# Patient Record
Sex: MALE | Race: WHITE
[De-identification: names, ages, dates, MRNs, and addresses within clinical notes are randomized per-mention and may not be internally consistent; named-entity substitution may affect disease eponyms.]

---

## 2017-06-14 ENCOUNTER — HOSPITAL ENCOUNTER (EMERGENCY)
Dept: HOSPITAL 26 - MED | Age: 57
Discharge: HOME | End: 2017-06-14
Payer: COMMERCIAL

## 2017-06-14 VITALS — DIASTOLIC BLOOD PRESSURE: 72 MMHG | SYSTOLIC BLOOD PRESSURE: 119 MMHG

## 2017-06-14 VITALS — WEIGHT: 220 LBS | BODY MASS INDEX: 29.8 KG/M2 | HEIGHT: 72 IN

## 2017-06-14 VITALS — SYSTOLIC BLOOD PRESSURE: 130 MMHG | DIASTOLIC BLOOD PRESSURE: 67 MMHG

## 2017-06-14 DIAGNOSIS — J45.909: ICD-10-CM

## 2017-06-14 DIAGNOSIS — K74.60: ICD-10-CM

## 2017-06-14 DIAGNOSIS — R19.7: ICD-10-CM

## 2017-06-14 DIAGNOSIS — R18.8: Primary | ICD-10-CM

## 2017-06-14 LAB
ALBUMIN FLD-MCNC: 3.2 G/DL (ref 3.4–5)
ALP SERPL-CCNC: 162 U/L (ref 46–116)
ALT SERPL-CCNC: 24 U/L (ref 12–78)
ANION GAP SERPL CALCULATED.3IONS-SCNC: 10.9 MMOL/L (ref 8–16)
APTT PPP: 28.5 SECS (ref 22–35.6)
AST SERPL-CCNC: 23 U/L (ref 15–37)
BASOPHILS # BLD AUTO: 0.1 K/UL (ref 0–0.22)
BASOPHILS NFR BLD AUTO: 1.3 % (ref 0–2)
BILIRUB SERPL-MCNC: 0.2 MG/DL (ref 0–1)
BUN SERPL-MCNC: 15 MG/DL (ref 7–18)
CALCIUM SPEC-MCNC: 9.2 MG/DL (ref 8.5–10.1)
CHLORIDE SERPL-SCNC: 107 MMOL/L (ref 98–107)
CO2 SERPL-SCNC: 32.2 MMOL/L (ref 21–32)
CREAT SERPL-MCNC: 1.1 MG/DL (ref 0.6–1.3)
EOSINOPHIL # BLD AUTO: 0.1 K/UL (ref 0–0.4)
EOSINOPHIL NFR BLD AUTO: 1.7 % (ref 0–4)
ERYTHROCYTE [DISTWIDTH] IN BLOOD BY AUTOMATED COUNT: 15.9 % (ref 11.6–13.7)
GFR SERPL CREATININE-BSD FRML MDRD: 88 ML/MIN (ref 90–?)
GLUCOSE SERPL-MCNC: 94 MG/DL (ref 74–106)
HCT VFR BLD AUTO: 42.3 % (ref 36–52)
HGB BLD-MCNC: 13.6 G/DL (ref 12–18)
INR PPP: 1.1 (ref 0.8–1.2)
LYMPHOCYTES # BLD AUTO: 0.9 K/UL (ref 2–11.5)
LYMPHOCYTES NFR BLD AUTO: 16.3 % (ref 20.5–51.1)
MCH RBC QN AUTO: 30 PG (ref 27–31)
MCHC RBC AUTO-ENTMCNC: 32 G/DL (ref 33–37)
MCV RBC AUTO: 93 FL (ref 80–94)
MONOCYTES # BLD AUTO: 0.5 K/UL (ref 0.8–1)
MONOCYTES NFR BLD AUTO: 8.4 % (ref 1.7–9.3)
NEUTROPHILS # BLD AUTO: 4.2 K/UL (ref 1.8–7.7)
NEUTROPHILS NFR BLD AUTO: 72.3 % (ref 42.2–75.2)
PLATELET # BLD AUTO: 143 K/UL (ref 140–450)
POTASSIUM SERPL-SCNC: 4.1 MMOL/L (ref 3.5–5.1)
PROT SERPL-MCNC: 8.9 G/DL (ref 6.4–8.2)
PROTHROMBIN TIME: 11.2 SECS (ref 10.8–13.4)
RBC # BLD AUTO: 4.57 MIL/UL (ref 4.2–6.1)
SODIUM SERPL-SCNC: 146 MMOL/L (ref 136–145)
WBC # BLD AUTO: 5.8 K/UL (ref 4.8–10.8)

## 2017-06-14 NOTE — NUR
PATIENT IS A 56 YO MALE BIB CARETAKER VIA WHEELCHAIR FOR BLOODY DIARRHEA AND 
ABDOMINAL PAIN. PATIENT IS AWAKE AND ALERT, ABLE TO MAKE NEEDS KNOWN SEEN IN 
OVERFLOW BY DR. LEES.

## 2017-06-14 NOTE — NUR
Patient discharged with v/s stable. Written and verbal after care instructions 
given and explained. 

Patient alert, oriented and verbalized understanding of instructions. Wheel 
Chair Assisted with by caregiver. All questions addressed prior to discharge. 
ID band removed. Patient advised to follow up with PMD. Rx of ULTRAM AND CIPRO 
given. Patient educated on indication of medication including possible reaction 
and side effects. Opportunity to ask questions provided and answered.

## 2017-07-25 ENCOUNTER — HOSPITAL ENCOUNTER (EMERGENCY)
Dept: HOSPITAL 26 - MED | Age: 57
Discharge: HOME | End: 2017-07-25
Payer: COMMERCIAL

## 2017-07-25 VITALS — HEIGHT: 75 IN | WEIGHT: 196 LBS | BODY MASS INDEX: 24.37 KG/M2

## 2017-07-25 VITALS — DIASTOLIC BLOOD PRESSURE: 81 MMHG | SYSTOLIC BLOOD PRESSURE: 118 MMHG

## 2017-07-25 DIAGNOSIS — S42.031A: Primary | ICD-10-CM
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
## 2017-07-25 DIAGNOSIS — G40.909: ICD-10-CM

## 2017-07-25 DIAGNOSIS — Z85.9: ICD-10-CM

## 2017-10-04 ENCOUNTER — HOSPITAL ENCOUNTER (OUTPATIENT)
Dept: HOSPITAL 26 - MRD | Age: 57
Discharge: HOME | End: 2017-10-04
Payer: COMMERCIAL

## 2017-10-04 DIAGNOSIS — S42.001D: Primary | ICD-10-CM
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
## 2021-11-26 ENCOUNTER — HOSPITAL ENCOUNTER (EMERGENCY)
Dept: HOSPITAL 26 - MED | Age: 61
Discharge: HOME | End: 2021-11-26
Payer: COMMERCIAL

## 2021-11-26 VITALS — HEIGHT: 75 IN | WEIGHT: 200 LBS | BODY MASS INDEX: 24.87 KG/M2

## 2021-11-26 VITALS — DIASTOLIC BLOOD PRESSURE: 98 MMHG | SYSTOLIC BLOOD PRESSURE: 135 MMHG

## 2021-11-26 VITALS — SYSTOLIC BLOOD PRESSURE: 135 MMHG | DIASTOLIC BLOOD PRESSURE: 98 MMHG

## 2021-11-26 DIAGNOSIS — S09.90XA: Primary | ICD-10-CM

## 2021-11-26 DIAGNOSIS — Z88.6: ICD-10-CM
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
## 2021-11-26 DIAGNOSIS — Z79.899: ICD-10-CM

## 2021-11-26 DIAGNOSIS — J45.909: ICD-10-CM

## 2021-11-26 DIAGNOSIS — Z85.038: ICD-10-CM

## 2021-11-26 DIAGNOSIS — Z88.0: ICD-10-CM
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
## 2021-11-26 NOTE — NUR
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 PT C/O HEAD 
PAIN 6/10 DESCRIBES AS ACHING NON-RADIATING. PT DENIES LOC. DENIES 
FEVER/CHILLS. DENIES N/V/D.



PMH: EPILEPSY



ALLERGIES: PENICILLIN, ASPIRIN, CEPHALOSPORIN

## 2021-11-26 NOTE — NUR
Patient discharged with v/s stable. Written and verbal after care instructions 
given FOR HEAD INJURY and explained. 

Patient verbalized understanding. Wheel Chair Assisted with by caregiver. All 
questions addressed prior to discharge. Advised to follow up with PMD.

## 2022-09-24 ENCOUNTER — HOSPITAL ENCOUNTER (EMERGENCY)
Dept: HOSPITAL 26 - MED | Age: 62
Discharge: HOME | End: 2022-09-24
Payer: COMMERCIAL

## 2022-09-24 VITALS — BODY MASS INDEX: 26.24 KG/M2 | WEIGHT: 198 LBS | HEIGHT: 73 IN

## 2022-09-24 VITALS — SYSTOLIC BLOOD PRESSURE: 119 MMHG | DIASTOLIC BLOOD PRESSURE: 67 MMHG

## 2022-09-24 VITALS — DIASTOLIC BLOOD PRESSURE: 65 MMHG | SYSTOLIC BLOOD PRESSURE: 99 MMHG

## 2022-09-24 DIAGNOSIS — Z88.0: ICD-10-CM

## 2022-09-24 DIAGNOSIS — Z79.899: ICD-10-CM

## 2022-09-24 DIAGNOSIS — Z86.69: ICD-10-CM

## 2022-09-24 DIAGNOSIS — Z20.822: ICD-10-CM

## 2022-09-24 DIAGNOSIS — J45.909: ICD-10-CM

## 2022-09-24 DIAGNOSIS — Z79.2: ICD-10-CM

## 2022-09-24 DIAGNOSIS — J18.9: Primary | ICD-10-CM

## 2022-09-24 DIAGNOSIS — Z91.010: ICD-10-CM

## 2022-09-24 DIAGNOSIS — Z98.890: ICD-10-CM

## 2022-09-24 DIAGNOSIS — Z88.6: ICD-10-CM

## 2022-09-24 DIAGNOSIS — Z88.1: ICD-10-CM

## 2022-09-24 DIAGNOSIS — Z85.038: ICD-10-CM

## 2022-09-24 DIAGNOSIS — Z91.018: ICD-10-CM

## 2022-09-24 DIAGNOSIS — Z79.891: ICD-10-CM

## 2022-09-24 LAB
ALBUMIN FLD-MCNC: 2.4 G/DL (ref 3.4–5)
ANION GAP SERPL CALCULATED.3IONS-SCNC: 10.1 MMOL/L (ref 8–16)
AST SERPL-CCNC: 23 U/L (ref 15–37)
BASOPHILS # BLD AUTO: 0 K/UL (ref 0–0.22)
BASOPHILS NFR BLD AUTO: 0.4 % (ref 0–2)
BILIRUB SERPL-MCNC: 0.5 MG/DL (ref 0–1)
BUN SERPL-MCNC: 10 MG/DL (ref 7–18)
CHLORIDE SERPL-SCNC: 93 MMOL/L (ref 98–107)
CO2 SERPL-SCNC: 30.5 MMOL/L (ref 21–32)
CREAT SERPL-MCNC: 0.7 MG/DL (ref 0.6–1.3)
EOSINOPHIL # BLD AUTO: 0.1 K/UL (ref 0–0.4)
EOSINOPHIL NFR BLD AUTO: 2.9 % (ref 0–4)
ERYTHROCYTE [DISTWIDTH] IN BLOOD BY AUTOMATED COUNT: 16.8 % (ref 11.6–13.7)
GFR SERPL CREATININE-BSD FRML MDRD: 146 ML/MIN (ref 90–?)
GLUCOSE SERPL-MCNC: 97 MG/DL (ref 74–106)
HCT VFR BLD AUTO: 41.7 % (ref 36–52)
HGB BLD-MCNC: 14 G/DL (ref 12–18)
LYMPHOCYTES # BLD AUTO: 0.5 K/UL (ref 2–11.5)
LYMPHOCYTES NFR BLD AUTO: 11.1 % (ref 20.5–51.1)
MCH RBC QN AUTO: 32 PG (ref 27–31)
MCHC RBC AUTO-ENTMCNC: 34 G/DL (ref 33–37)
MCV RBC AUTO: 95.8 FL (ref 80–94)
MONOCYTES # BLD AUTO: 0.6 K/UL (ref 0.8–1)
MONOCYTES NFR BLD AUTO: 14.8 % (ref 1.7–9.3)
NEUTROPHILS # BLD AUTO: 3 K/UL (ref 1.8–7.7)
NEUTROPHILS NFR BLD AUTO: 70.8 % (ref 42.2–75.2)
PLATELET # BLD AUTO: 186 K/UL (ref 140–450)
POTASSIUM SERPL-SCNC: 4.6 MMOL/L (ref 3.5–5.1)
RBC # BLD AUTO: 4.36 MIL/UL (ref 4.2–6.1)
SODIUM SERPL-SCNC: 129 MMOL/L (ref 136–145)
WBC # BLD AUTO: 4.3 K/UL (ref 4.8–10.8)

## 2022-09-24 PROCEDURE — 87040 BLOOD CULTURE FOR BACTERIA: CPT

## 2022-09-24 PROCEDURE — 94640 AIRWAY INHALATION TREATMENT: CPT

## 2022-09-24 PROCEDURE — 71045 X-RAY EXAM CHEST 1 VIEW: CPT

## 2022-09-24 PROCEDURE — 83880 ASSAY OF NATRIURETIC PEPTIDE: CPT

## 2022-09-24 PROCEDURE — 36415 COLL VENOUS BLD VENIPUNCTURE: CPT

## 2022-09-24 PROCEDURE — 87426 SARSCOV CORONAVIRUS AG IA: CPT

## 2022-09-24 PROCEDURE — 84484 ASSAY OF TROPONIN QUANT: CPT

## 2022-09-24 PROCEDURE — 93005 ELECTROCARDIOGRAM TRACING: CPT

## 2022-09-24 PROCEDURE — 80053 COMPREHEN METABOLIC PANEL: CPT

## 2022-09-24 PROCEDURE — 96365 THER/PROPH/DIAG IV INF INIT: CPT

## 2022-09-24 PROCEDURE — 99285 EMERGENCY DEPT VISIT HI MDM: CPT

## 2022-09-24 PROCEDURE — 85025 COMPLETE CBC W/AUTO DIFF WBC: CPT

## 2022-09-24 PROCEDURE — 83605 ASSAY OF LACTIC ACID: CPT

## 2022-09-24 NOTE — NUR
Patient's group home called, spoke with Jose who is the primary nurse. Jose 
asked if patient would be admitted but was told that patient would not be 
admitted at this time. Was asked for pharmacy information but Jose stated that 
group home uses specialty pharmacy and written prescription would be prefered. 
Number called, [phone]. MD made aware for discharge orders RX.

## 2022-09-30 ENCOUNTER — HOSPITAL ENCOUNTER (EMERGENCY)
Dept: HOSPITAL 26 - MED | Age: 62
Discharge: HOME | End: 2022-09-30
Payer: COMMERCIAL

## 2022-09-30 VITALS — HEIGHT: 70 IN | WEIGHT: 220 LBS | BODY MASS INDEX: 31.5 KG/M2

## 2022-09-30 VITALS — SYSTOLIC BLOOD PRESSURE: 138 MMHG | DIASTOLIC BLOOD PRESSURE: 78 MMHG

## 2022-09-30 DIAGNOSIS — Z79.899: ICD-10-CM

## 2022-09-30 DIAGNOSIS — J45.909: ICD-10-CM

## 2022-09-30 DIAGNOSIS — Z88.6: ICD-10-CM

## 2022-09-30 DIAGNOSIS — N30.00: Primary | ICD-10-CM

## 2022-09-30 DIAGNOSIS — R33.9: ICD-10-CM

## 2022-09-30 DIAGNOSIS — Z85.9: ICD-10-CM

## 2022-09-30 DIAGNOSIS — Z88.0: ICD-10-CM

## 2022-09-30 NOTE — NUR
Patient discharged. Written and verbal after care instructions given and 
explained indwelling urinary catheter, urinary tract infection, and acute 
urinary retention. 

Patient alert, oriented and verbalized understanding of instructions. Wheel 
Chair Assisted by caregiver. All questions addressed prior to discharge. ID 
band removed. Patient advised to follow up with PMD. Rx of Cipro given. Patient 
educated on indication of medication including possible reaction and side 
effects. Opportunity to ask questions provided and answered.

## 2022-09-30 NOTE — NUR
-------------------------------------------------------------------------------

            *** Note undone in [place] - 10/01/22 at 0109 by HJAA ***            

-------------------------------------------------------------------------------

APPLIED LEG BAG TO PATIENT. PATIENT TOLERATED WELL.

## 2022-12-25 ENCOUNTER — HOSPITAL ENCOUNTER (EMERGENCY)
Dept: HOSPITAL 26 - MED | Age: 62
Discharge: HOME | End: 2022-12-25
Payer: COMMERCIAL

## 2022-12-25 VITALS — HEIGHT: 70 IN | BODY MASS INDEX: 31.5 KG/M2 | WEIGHT: 220 LBS

## 2022-12-25 VITALS — DIASTOLIC BLOOD PRESSURE: 75 MMHG | SYSTOLIC BLOOD PRESSURE: 114 MMHG

## 2022-12-25 VITALS — SYSTOLIC BLOOD PRESSURE: 116 MMHG | DIASTOLIC BLOOD PRESSURE: 72 MMHG
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
## 2022-12-25 DIAGNOSIS — Z88.6: ICD-10-CM

## 2022-12-25 DIAGNOSIS — Z79.899: ICD-10-CM

## 2022-12-25 DIAGNOSIS — Z85.9: ICD-10-CM
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
## 2022-12-25 DIAGNOSIS — J45.909: ICD-10-CM

## 2022-12-25 DIAGNOSIS — S09.90XA: Primary | ICD-10-CM
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
## 2022-12-25 DIAGNOSIS — K21.9: ICD-10-CM

## 2022-12-25 DIAGNOSIS — Z88.0: ICD-10-CM

## 2022-12-25 NOTE — NUR
CT DONE NEGATIVE. 

Patient discharged with v/s stable. Written and verbal after care instructions 
given and explained. 

Patient verbalized understanding. Wheel Chair Assisted with by caregiver. All 
questions addressed prior to discharge. Advised to follow up with PMD.